# Patient Record
Sex: MALE | Race: WHITE | NOT HISPANIC OR LATINO | Employment: OTHER | URBAN - METROPOLITAN AREA
[De-identification: names, ages, dates, MRNs, and addresses within clinical notes are randomized per-mention and may not be internally consistent; named-entity substitution may affect disease eponyms.]

---

## 2020-06-02 NOTE — PATIENT DISCUSSION
Discussed condition and exacerbating conditions/situations (e.g., dry/arid environments, overhead fans, air conditioners, side effect of medications).
Discussed lid hygiene, warm compress and eyelid wash.
Good postoperative appearance.
Monitor.
One Arch Antony PHOTOS STABLE OU.
PT WILL COME BACK ONE MORE TIME TO SEE LACHO, THEN INTRODUCE TO O.ANALISA PT AWARE.
Patient understands condition, prognosis and need for follow up care.
Recommended artificial tears to use as directed.
Recommended artificial tears to use: 1 drop 4x a day in both eyes.
Traumatic injury of head, initial encounter

## 2022-02-07 NOTE — PATIENT DISCUSSION
pt noticing a veil over the top part of her vision; possible retinal tear seen today.  Refer to retina for eval.

## 2022-02-08 NOTE — PATIENT DISCUSSION
Discussed risks of anesthesia which are unrelated to direct risks of retinal surgery, and which include risk of death.

## 2022-02-08 NOTE — PATIENT DISCUSSION
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 Advised patient that she should plan on not working for 6 weeks, no Yoga and no rides at [employer]. Patient should plan no activities for at least 2 weeks. Discussed in detail with patient and stressed the importance of following instructions to have the best surgical outcome.

## 2022-02-08 NOTE — PATIENT DISCUSSION
Recommended VITRECTOMY, endolaser, FLUID/GAS EXCHANGE. Discussed benefits, alternatives, and risks of surgery including (but not limited to) glaucoma, infection, bleeding, re-detachment, optic neuropathy, loss of vision, blindness, and loss of eye. Patient understands more than one operation may be needed to repair retinal detachment.

## 2022-02-09 NOTE — PATIENT DISCUSSION
due to area of tears  used Alea OIl instead of Gas during surgery but still rec face down at this time(discussed in detail) until next visit 1 full week.

## 2022-02-09 NOTE — PATIENT DISCUSSION
due to face down positioning can worsen hyphema, possible role for PPV/AC washout in future if does not clear.

## 2022-02-10 NOTE — PATIENT DISCUSSION
due to area of tears  used Alea OIl instead of Gas during surgery but still rec face down at this time(discussed in detail) likely 1 full week but possibly longer.  Will eventually require future sx to remove Oil as discussed.  Post restrictions edu reviewed in detail.

## 2022-02-16 NOTE — PROCEDURE NOTE: CLINICAL
PROCEDURE NOTE: A/C Paracentesis OS. Diagnosis: Ocular Hypertension. Anesthesia: Proparacaine 0.5%. Prior to treatment, the risks/benefits/alternatives were discussed. The patient wished to proceed with procedure. Location of Tap: Temporal Limbus. The eye was prepped with topical Betadine 5%, a sterile lid speculum was placed in the eye. Volume of tap: 0.1 ml. Patient tolerated procedure well. There were no complications. Post procedure instructions given. Patient given office phone number/answering service number and advised to call immediately should there be an increase in floaters or redness, loss of vision or pain, or should they have any other questions or concerns. Baylee Munoz

## 2022-02-23 NOTE — PATIENT DISCUSSION
Plan for 25g PPV, SO removal and A/C washout, Air in 2 weeks. Sx scheduled for 3/9/22. H&P/Consents to be done at 3/2/22 appt.

## 2022-02-23 NOTE — PATIENT DISCUSSION
due to area of tears  used Alea OIl instead of Gas during surgery. Okay to be upright during the day and either side at night, still not on back.

## 2022-03-02 NOTE — PATIENT DISCUSSION
Continue Gtts: Brimonidine BID, generic Maxitrol QID, Add Rocklatan QHS  and Discontinue PF Cosopt BID.

## 2022-03-02 NOTE — PATIENT DISCUSSION
Due to area of tears  used Alea OIl instead of Gas during surgery. Okay to be upright during the day and either side at night, still not on back. Okay for water aerobics but careful to not get water in the eyes.

## 2022-03-09 NOTE — PATIENT DISCUSSION
Avoid exposure of the eye to non-sterile fluid such as shower/bath water but now okay to swim/water aerobics.

## 2022-03-10 NOTE — PATIENT DISCUSSION
Advised that patient should be in an upright position for the next week. She can use 4 or 5 pillows and be at a 45 degree angle.

## 2022-03-16 NOTE — PATIENT DISCUSSION
Advised that patient should continue to  sleep on back either side. be in an upright position now using 2 or 3 pillows. Patient can lift up to 20 lbs, walking and swimming are allowed. Continue Gtts: Brimonidine BID, generic Maxitrol QID. Return 2 weeks for PO.

## 2022-03-30 NOTE — PATIENT DISCUSSION
Improving VH but still present and visually significant, rec 25g PPV, poss EL, diathermy, AFG x change to remove blood.  Pt consent reviewed and signed.

## 2022-03-30 NOTE — PATIENT DISCUSSION
IOP can rise with hyphema; WNL today. Negative for neck pain. Skin: Negative for rash. Objective:     BP (!) 146/82   Pulse 56   Temp 97.5 °F (36.4 °C)   Resp 14   Ht 5' 10.98\" (1.803 m)   Wt 177 lb 3.2 oz (80.4 kg)   BMI 24.73 kg/m²     Physical Exam   Constitutional: Vital signs are normal. He appears well-developed and well-nourished. He is active and cooperative. Non-toxic appearance. He does not have a sickly appearance. He does not appear ill. No distress. HENT:   Head: Normocephalic and atraumatic. Right Ear: Hearing, tympanic membrane, external ear and ear canal normal.   Left Ear: Tympanic membrane, external ear and ear canal normal. No tenderness. No foreign bodies. No middle ear effusion. Decreased hearing is noted. Nose: Right sinus exhibits no maxillary sinus tenderness and no frontal sinus tenderness. Left sinus exhibits no maxillary sinus tenderness and no frontal sinus tenderness. Mouth/Throat: Uvula is midline, oropharynx is clear and moist and mucous membranes are normal.   Unable to perform Rinne and Lovina Main test in office. Neurological: He is alert. Nursing note and vitals reviewed. Assessment/Plan:      Fang Cornejo was seen today for hearing problem. Diagnoses and all orders for this visit:    Hearing loss of left ear, unspecified hearing loss type    I did contact patient's audiologist and [facility] [place] [facility] without success. Hector spoke with [facility] they are going to try and work him in.     [facility] contacting patient to help with solution. Return if symptoms worsen or fail to improve. Patient instructions given and reviewed.

## 2022-03-30 NOTE — PATIENT DISCUSSION
Improving VH but still present and visually significant, rec 25g PPV, poss EL, diathermy, to remove blood and re-enforce with additional gas bubble due to h/o RD/RT.

## 2022-03-30 NOTE — PATIENT DISCUSSION
Advised that patient should continue to  sleep on back either side. be in an upright position now using 2 or 3 pillows. Patient can lift up to 20 lbs, walking and swimming are allowed.  Gtts: D/C Brimonidine , generic Maxitrol cont. BID. ICU Vital Signs Last 24 Hrs  T(C): 36.9 (27 Oct 2020 07:17), Max: 37.1 (26 Oct 2020 23:19)  T(F): 98.4 (27 Oct 2020 07:17), Max: 98.8 (26 Oct 2020 23:19)  HR: 50 (27 Oct 2020 07:00) (50 - 95)  BP: 106/61 (27 Oct 2020 07:00) (100/62 - 125/83)  BP(mean): 79 (27 Oct 2020 07:00) (73 - 100)  RR: 26 (27 Oct 2020 07:00) (15 - 60)  SpO2: 99% (27 Oct 2020 07:00) (96% - 100%)

## 2022-04-20 ENCOUNTER — EMERGENCY VISIT (OUTPATIENT)
Dept: URBAN - METROPOLITAN AREA CLINIC 25 | Facility: CLINIC | Age: 52
End: 2022-04-20

## 2022-04-20 DIAGNOSIS — H00.11: ICD-10-CM

## 2022-04-20 DIAGNOSIS — H04.123: ICD-10-CM

## 2022-04-20 PROCEDURE — 92002 INTRM OPH EXAM NEW PATIENT: CPT

## 2022-04-20 ASSESSMENT — VISUAL ACUITY
OD_CC: 20/25
OS_CC: 20/25

## 2022-05-23 NOTE — PATIENT DISCUSSION
pt can make travel plans from end of June as long as driving, if bubble still present before leaving can remove in office if needed.  + old blood on back of lens, possible role for YAG laser in future.

## 2022-05-23 NOTE — PATIENT DISCUSSION
Vision will not improve until gas bubble diminishes in size, pt has had multiple surgeries and h/o of bleed so there are some expected limitations to visual potential and long healing time  6m-1yr to reach max va potential.

## 2022-06-23 NOTE — PATIENT DISCUSSION
Recommend AC washout in OR vs just YAG. Would recommend everything to be done at once in the OR. Patient is going on vacation for 5 weeks and would like to postpone until she returns in August. There is not a risk for patient to wait and she is able to go on vacation as scheduled. Will do H&P at visit in August in [facility] prior to surgery.

## 2022-06-23 NOTE — PATIENT DISCUSSION
Cont. maxitrol to BID until she returns in August; new sample given today and patient will call if she needs a refill while out of towm. Lanie Pardo

## 2024-06-19 NOTE — PATIENT DISCUSSION
Discussed lid hygiene, warm compress and eyelid wash.   Chloride, Whole Blood 98 - 109 meq/l 104     Albumin 3.5 - 5.1 g/dL 4.5 4.3    Alkaline Phosphatase 38 - 126 U/L 52 59    ALT 11 - 66 U/L 7 (L) 6 (L)    AST 5 - 40 U/L 12 10    Total Bilirubin 0.3 - 1.2 mg/dL 0.7 0.8    Bilirubin, Direct 0.0 - 0.3 mg/dL <0.2 <0.2    WBC 4.8 - 10.8 thou/mm3 4.5 (L)  4.8   RBC 4.70 - 6.10 mill/mm3 4.25 (L)  4.31 (L)   Hemoglobin Quant 14.0 - 18.0 gm/dl 13.1 (L)  13.0 (L)   Hematocrit 42.0 - 52.0 % 38.4 (L)  40.0 (L)   MCV 80.0 - 94.0 fL 90  92.8   MCH 26.0 - 33.0 pg 30.8  30.2   MCHC 32.2 - 35.5 gm/dl 34.1  32.5   MPV 9.4 - 12.4 fL 9.1 (L)  10.8   RDW 11.5 - 14.5 % 13.4     RDW-CV 11.5 - 14.5 %   13.3   RDW-SD 35.0 - 45.0 fL   45.2 (H)   Platelet Count 130 - 400 thou/mm3 134  127 (L)   Monocytes % %   7.6   Eosinophils % 0 - 4 % 3     Neutrophils Absolute 1.8 - 7.7 thou/mm3 2.9  2.9   Lymphocytes Absolute 1.0 - 4.8 thou/mm3 1.2  1.3   Monocytes Absolute 0.4 - 1.3 thou/mm3 0.4  0.4   Eosinophils Absolute 0.0 - 0.4 thou/mm3 0.1  0.1   Basophils Absolute 0.0 - 0.1 thou/mm3 0.0  0.0     PATHOLOGY:   -2/27/1998. Dr Naqvi performed a radical retropubic prostatectomy and lymph node dissection.  The prostate cancer was 3+5 = 8.  The moderately differentiated adenocarcinoma was multifocal and bilateral throughout 30 to 40% of the total prostate gland with penetration of the capsule and involvement of the right seminal vesicle.  1 out of 4 right obturator lymph nodes was positive for cancer.       -2/8/2023.  Cystoscopy by Dr. Sewell.  The largest tumor was roughly 3-1/2 cm but there were satellite lesions throughout the bladder more on the trigone and the back wall.  All visible tumor was removed.  He said that the patient needed to be scheduled for TURBT with Gemzar.  Pathology showed noninvasive high-grade papillary urothelial carcinoma.  Muscle was present in the specimen and was not involved.         Follow Up: Return in about 4 weeks (around 7/17/2024).       Huy Dumont MD